# Patient Record
Sex: FEMALE | Race: WHITE | Employment: FULL TIME | URBAN - METROPOLITAN AREA
[De-identification: names, ages, dates, MRNs, and addresses within clinical notes are randomized per-mention and may not be internally consistent; named-entity substitution may affect disease eponyms.]

---

## 2017-06-15 ENCOUNTER — OP HISTORICAL/CONVERTED ENCOUNTER (OUTPATIENT)
Dept: OTHER | Age: 27
End: 2017-06-15

## 2017-08-24 ENCOUNTER — OP HISTORICAL/CONVERTED ENCOUNTER (OUTPATIENT)
Dept: OTHER | Age: 27
End: 2017-08-24

## 2017-08-27 ENCOUNTER — IP HISTORICAL/CONVERTED ENCOUNTER (OUTPATIENT)
Dept: OTHER | Age: 27
End: 2017-08-27

## 2018-03-05 ENCOUNTER — ED HISTORICAL/CONVERTED ENCOUNTER (OUTPATIENT)
Dept: OTHER | Age: 28
End: 2018-03-05

## 2018-09-21 ENCOUNTER — OP HISTORICAL/CONVERTED ENCOUNTER (OUTPATIENT)
Dept: OTHER | Age: 28
End: 2018-09-21

## 2020-10-20 DIAGNOSIS — N39.0 URINARY TRACT INFECTION WITHOUT HEMATURIA, SITE UNSPECIFIED: Primary | ICD-10-CM

## 2020-10-20 RX ORDER — CIPROFLOXACIN 500 MG/1
500 TABLET ORAL 2 TIMES DAILY
Qty: 14 TAB | Refills: 0 | Status: SHIPPED | OUTPATIENT
Start: 2020-10-20 | End: 2020-10-27

## 2020-12-04 DIAGNOSIS — G47.429 NARCOLEPSY DUE TO UNDERLYING CONDITION WITHOUT CATAPLEXY: Primary | ICD-10-CM

## 2020-12-04 RX ORDER — ARMODAFINIL 200 MG/1
1 TABLET ORAL DAILY
Qty: 60 TAB | Refills: 2 | Status: SHIPPED | OUTPATIENT
Start: 2020-12-04

## 2020-12-17 ENCOUNTER — OFFICE VISIT (OUTPATIENT)
Dept: PRIMARY CARE CLINIC | Age: 30
End: 2020-12-17
Payer: COMMERCIAL

## 2020-12-17 VITALS — TEMPERATURE: 98.2 F | HEART RATE: 94 BPM | OXYGEN SATURATION: 98 %

## 2020-12-17 DIAGNOSIS — Z13.83 SCREENING FOR CARDIOVASCULAR, RESPIRATORY, AND GENITOURINARY DISEASES: ICD-10-CM

## 2020-12-17 DIAGNOSIS — Z13.6 SCREENING FOR CARDIOVASCULAR, RESPIRATORY, AND GENITOURINARY DISEASES: ICD-10-CM

## 2020-12-17 DIAGNOSIS — R11.0 NAUSEA: ICD-10-CM

## 2020-12-17 DIAGNOSIS — Z13.89 SCREENING FOR CARDIOVASCULAR, RESPIRATORY, AND GENITOURINARY DISEASES: ICD-10-CM

## 2020-12-17 DIAGNOSIS — R68.89 FLU-LIKE SYMPTOMS: Primary | ICD-10-CM

## 2020-12-17 LAB
FLUAV+FLUBV AG NOSE QL IA.RAPID: NEGATIVE
FLUAV+FLUBV AG NOSE QL IA.RAPID: NEGATIVE
VALID INTERNAL CONTROL?: YES

## 2020-12-17 PROCEDURE — 99202 OFFICE O/P NEW SF 15 MIN: CPT | Performed by: NURSE PRACTITIONER

## 2020-12-17 PROCEDURE — 87804 INFLUENZA ASSAY W/OPTIC: CPT | Performed by: NURSE PRACTITIONER

## 2020-12-17 RX ORDER — ONDANSETRON 8 MG/1
8 TABLET, ORALLY DISINTEGRATING ORAL
Qty: 21 TAB | Refills: 0 | Status: SHIPPED | OUTPATIENT
Start: 2020-12-17

## 2020-12-19 LAB — SARS-COV-2, NAA: NOT DETECTED

## 2020-12-29 ENCOUNTER — OFFICE VISIT (OUTPATIENT)
Dept: PRIMARY CARE CLINIC | Age: 30
End: 2020-12-29
Payer: COMMERCIAL

## 2020-12-29 VITALS — OXYGEN SATURATION: 98 % | TEMPERATURE: 98.1 F | HEART RATE: 80 BPM

## 2020-12-29 DIAGNOSIS — R53.83 FATIGUE, UNSPECIFIED TYPE: ICD-10-CM

## 2020-12-29 DIAGNOSIS — Z13.89 SCREENING FOR CARDIOVASCULAR, RESPIRATORY, AND GENITOURINARY DISEASES: ICD-10-CM

## 2020-12-29 DIAGNOSIS — Z13.83 SCREENING FOR CARDIOVASCULAR, RESPIRATORY, AND GENITOURINARY DISEASES: ICD-10-CM

## 2020-12-29 DIAGNOSIS — R05.9 COUGH: ICD-10-CM

## 2020-12-29 DIAGNOSIS — Z20.822 SUSPECTED SEVERE ACUTE RESPIRATORY SYNDROME CORONAVIRUS 2 (SARS-COV-2) INFECTION: ICD-10-CM

## 2020-12-29 DIAGNOSIS — Z20.822 EXPOSURE TO COVID-19 VIRUS: Primary | ICD-10-CM

## 2020-12-29 DIAGNOSIS — Z13.6 SCREENING FOR CARDIOVASCULAR, RESPIRATORY, AND GENITOURINARY DISEASES: ICD-10-CM

## 2020-12-29 PROCEDURE — 99213 OFFICE O/P EST LOW 20 MIN: CPT | Performed by: NURSE PRACTITIONER

## 2021-01-01 ENCOUNTER — TELEPHONE (OUTPATIENT)
Dept: PRIMARY CARE CLINIC | Age: 31
End: 2021-01-01

## 2021-01-01 LAB — SARS-COV-2, NAA: DETECTED

## 2021-01-01 NOTE — PROGRESS NOTES
Berenice Monteiro is a 31 y.o. female who was seen in clinic today (1/1/2021) for an acute visit. Assessment/Plan:          * Covid 19 sample submitted to [facility]       * Patient given detailed CDC instructions contained within After Visit Summary    Diagnoses and all orders for this visit:    1. Exposure to COVID-19 virus  -     NOVEL CORONAVIRUS (COVID-19)    2. Screening for cardiovascular, respiratory, and genitourinary diseases  -     NOVEL CORONAVIRUS (COVID-19)    3. Cough    4. Fatigue, unspecified type    5. Suspected severe acute respiratory syndrome coronavirus 2 (SARS-CoV-2) infection    Other orders  -     DROPLET PLUS; Standing    (Addendum on 1/1/2020) Pt has tested pos for covid 19. She is aware of test results and is continuing her quarantine. Likely initial false negative covid-19. Although has been 10 days since onset; she has not met symptom based criteria of symptom resolution. She will RTW or see me in clinic as directed by employee health, but until she is % back to baseline with symptoms, I will continue to recommend additional time off and quarantine based on CDC guidance. Subjective:   Rina Elizabeth was seen today for return to work with lower resp tract infection due to covid-19. [employer] employee, she is an RN. Was initially screened negative for 12/17/2020 covid 19 despite having symptoms and exposure (see historical note). Symptoms have been off and in, with some improvement and then worsening again. She is still experiencing cough, sob, severe fatigue, alteration smell/taste. She has been taking OTC cold preparations prn with some relief. She denies a recent history/current: fever, wheezing. She still feels unable to return to work. Travel Screening     Question   Response    In the last month, have you been in contact with someone who was confirmed or suspected to have Coronavirus / COVID-19?   Yes    Have you had a COVID-19 viral test in the last 14 days?  Yes - Negative result    Do you have any of the following new or worsening symptoms? Cough; Shortness of breath; Fatigue; Loss of smell; Loss of taste    Have you traveled internationally in the last month? No      Travel History   Travel since 12/01/20     No documented travel since 12/01/20          ROS - Pertinent items are noted in HPI    Patient Active Problem List   Diagnosis Code    Morbid obesity ([facility] [address] E66.01    Reflux [id number]     Home Medications    Medication Sig Start Date End Date Taking? Authorizing Provider   ondansetron (ZOFRAN ODT) 8 mg disintegrating tablet Take 1 Tab by mouth every eight (8) hours as needed for Nausea or Vomiting. 12/17/20   Aiden Santana NP   armodafiniL (NuvigiL) 200 mg tab Take 1 Tab by mouth daily. Max Daily Amount: 1 Tab. 12/4/20   Jesse Patterson MD   ethinyl estradiol-etonogestrel (NUVARING) 0.12-0.015 mg/24 hr vaginal ring by Intravaginally route. Provider, Historical   multivitamin (ONE A DAY) tablet Take 1 Tab by mouth daily. Provider, Historical   acetaminophen (TYLENOL) 325 mg tablet Take  by mouth every four (4) hours as needed. Provider, Historical      Allergies   Allergen Reactions    Pcn [Penicillins] Hives          Objective:   Physical Exam  General:   alert, cooperative, acutely ill   Ears: Normal external ear canals AU   Neck: supple, symmetrical, trachea midline. Heart: normal rate, regular rhythm   Lungs: clear to auscultation bilaterally       Visit Vitals  Pulse 80   Temp 98.1 °F (36.7 °C)   SpO2 98%         Disclaimer:        Medication risks/benefits/costs/interactions/alternatives discussed with patient. She was given an after visit summary which includes diagnoses, current medications, & vitals. She expressed understanding with the diagnosis and plan. Aspects of this note may have been generated using voice recognition software. Despite editing, there may be some syntax errors.        Rodolfo Scott NP

## 2021-01-07 ENCOUNTER — OFFICE VISIT (OUTPATIENT)
Dept: PRIMARY CARE CLINIC | Age: 31
End: 2021-01-07

## 2021-01-07 VITALS — TEMPERATURE: 98.1 F | HEART RATE: 70 BPM | OXYGEN SATURATION: 99 %

## 2021-01-07 DIAGNOSIS — Z86.16 HISTORY OF 2019 NOVEL CORONAVIRUS DISEASE (COVID-19): Primary | ICD-10-CM

## 2021-01-07 DIAGNOSIS — Z76.89 RETURN TO WORK EVALUATION: ICD-10-CM

## 2021-01-07 PROCEDURE — 99212 OFFICE O/P EST SF 10 MIN: CPT | Performed by: NURSE PRACTITIONER

## 2021-01-07 NOTE — PROGRESS NOTES
Mehreen Deal is a 31 y.o. female who was seen in clinic today (1/7/2021) for an acute visit. Assessment/Plan:            * Patient given detailed CDC instructions contained within After Visit Summary    Diagnoses and all orders for this visit:    1. History of 2019 novel coronavirus disease (COVID-19)    2. Return to work evaluation       known covid-19. Patient has met Centers for Disease Control symptom based criteria for no longer being contagious and ending quarantine. Additionally, she feels able to return to work. Based on my exam, I believe patient may return to work safely. Reviewed with her that COVID-19 pandemic is an evolving situation with rapidly changing recommendations & guidelines, continue to practice hand hygiene, social distancing, wearing of facial coverings; re-infections with covid-19 have been reported although risk remains low. Medical decisions are made based on the the best information available at the time. Recommended she stay tuned for updates published by trusted sources such as the CDC/[facility] websites and to advise your PCP of any unexpected changes in clinical condition     Subjective:   Lexis Diallo was seen today for return to work following lower resp tract infection due to covid-19. Was initially diagnosed with covid 19 on 12/17/2020. She denies a recent history/current: cough, fever, wheezing, SOB, and PEDRAZA. Feeling 100% of baseline normal.      Travel Screening     Question   Response    In the last month, have you been in contact with someone who was confirmed or suspected to have Coronavirus / COVID-19? Yes    Have you had a COVID-19 viral test in the last 14 days? Yes - Positive result    Do you have any of the following new or worsening symptoms? None of these    Have you traveled internationally in the last month?   No      Travel History   Travel since 12/07/20     No documented travel since 12/07/20          ROS - Pertinent items are noted in HPI    Patient Active Problem List   Diagnosis Code    Morbid obesity ([facility] E66.01    Reflux [id number]     Home Medications    Medication Sig Start Date End Date Taking? Authorizing Provider   ondansetron (ZOFRAN ODT) 8 mg disintegrating tablet Take 1 Tab by mouth every eight (8) hours as needed for Nausea or Vomiting. 12/17/20   Gabriel Fernández NP   armodafiniL (NuvigiL) 200 mg tab Take 1 Tab by mouth daily. Max Daily Amount: 1 Tab. 12/4/20   Lucy Greene MD   ethinyl estradiol-etonogestrel (NUVARING) 0.12-0.015 mg/24 hr vaginal ring by Intravaginally route. Provider, Historical   multivitamin (ONE A DAY) tablet Take 1 Tab by mouth daily. Provider, Historical   acetaminophen (TYLENOL) 325 mg tablet Take  by mouth every four (4) hours as needed. Provider, Historical      Allergies   Allergen Reactions    Pcn [Penicillins] Hives          Objective:   Physical Exam  General:   alert, cooperative, no distress   Ears: Normal external ear canals AU   Neck: supple, symmetrical, trachea midline. Heart: normal rate, regular rhythm   Lungs: clear to auscultation bilaterally       Visit Vitals  Pulse 70   Temp 98.1 °F (36.7 °C)   SpO2 99%         Disclaimer:        Medication risks/benefits/costs/interactions/alternatives discussed with patient. She was given an after visit summary which includes diagnoses, current medications, & vitals. She expressed understanding with the diagnosis and plan. Aspects of this note may have been generated using voice recognition software. Despite editing, there may be some syntax errors.        Nathen Helms NP

## 2022-11-29 ENCOUNTER — HOSPITAL ENCOUNTER (OUTPATIENT)
Dept: PREADMISSION TESTING | Age: 32
Discharge: HOME | End: 2022-11-29
Payer: COMMERCIAL

## 2022-11-29 VITALS
SYSTOLIC BLOOD PRESSURE: 130 MMHG | HEIGHT: 65 IN | DIASTOLIC BLOOD PRESSURE: 75 MMHG | HEART RATE: 75 BPM | OXYGEN SATURATION: 99 % | RESPIRATION RATE: 18 BRPM | WEIGHT: 206 LBS | TEMPERATURE: 98 F | BODY MASS INDEX: 34.32 KG/M2

## 2022-11-29 LAB
ANION GAP SERPL CALC-SCNC: 8 MMOL/L (ref 5–15)
ATRIAL RATE: 67 BPM
BASOPHILS # BLD: 0.1 K/UL (ref 0–0.1)
BASOPHILS NFR BLD: 1 % (ref 0–1)
BUN SERPL-MCNC: 13 MG/DL (ref 6–20)
BUN/CREAT SERPL: 13 (ref 12–20)
CALCIUM SERPL-MCNC: 9.3 MG/DL (ref 8.5–10.1)
CALCULATED P AXIS, ECG09: 66 DEGREES
CALCULATED R AXIS, ECG10: 21 DEGREES
CALCULATED T AXIS, ECG11: 18 DEGREES
CHLORIDE SERPL-SCNC: 103 MMOL/L (ref 97–108)
CO2 SERPL-SCNC: 29 MMOL/L (ref 21–32)
CREAT SERPL-MCNC: 0.99 MG/DL (ref 0.55–1.02)
DIAGNOSIS, 93000: NORMAL
DIFFERENTIAL METHOD BLD: NORMAL
EOSINOPHIL # BLD: 0.1 K/UL (ref 0–0.4)
EOSINOPHIL NFR BLD: 2 % (ref 0–7)
ERYTHROCYTE [DISTWIDTH] IN BLOOD BY AUTOMATED COUNT: 12.7 % (ref 11.5–14.5)
GLUCOSE SERPL-MCNC: 74 MG/DL (ref 65–100)
HCT VFR BLD AUTO: 42.7 % (ref 35–47)
HGB BLD-MCNC: 14 G/DL (ref 11.5–16)
IMM GRANULOCYTES # BLD AUTO: 0 K/UL (ref 0–0.04)
IMM GRANULOCYTES NFR BLD AUTO: 0 % (ref 0–0.5)
LYMPHOCYTES # BLD: 1.7 K/UL (ref 0.8–3.5)
LYMPHOCYTES NFR BLD: 18 % (ref 12–49)
MCH RBC QN AUTO: 29.6 PG (ref 26–34)
MCHC RBC AUTO-ENTMCNC: 32.8 G/DL (ref 30–36.5)
MCV RBC AUTO: 90.3 FL (ref 80–99)
MONOCYTES # BLD: 0.6 K/UL (ref 0–1)
MONOCYTES NFR BLD: 7 % (ref 5–13)
NEUTS SEG # BLD: 6.9 K/UL (ref 1.8–8)
NEUTS SEG NFR BLD: 72 % (ref 32–75)
NRBC # BLD: 0 K/UL (ref 0–0.01)
NRBC BLD-RTO: 0 PER 100 WBC
P-R INTERVAL, ECG05: 126 MS
PLATELET # BLD AUTO: 192 K/UL (ref 150–400)
PMV BLD AUTO: 10.9 FL (ref 8.9–12.9)
POTASSIUM SERPL-SCNC: 4.2 MMOL/L (ref 3.5–5.1)
Q-T INTERVAL, ECG07: 416 MS
QRS DURATION, ECG06: 80 MS
QTC CALCULATION (BEZET), ECG08: 439 MS
RBC # BLD AUTO: 4.73 M/UL (ref 3.8–5.2)
SODIUM SERPL-SCNC: 140 MMOL/L (ref 136–145)
VENTRICULAR RATE, ECG03: 67 BPM
WBC # BLD AUTO: 9.5 K/UL (ref 3.6–11)

## 2022-11-29 PROCEDURE — 85025 COMPLETE CBC W/AUTO DIFF WBC: CPT

## 2022-11-29 PROCEDURE — 93005 ELECTROCARDIOGRAM TRACING: CPT

## 2022-11-29 PROCEDURE — 80048 BASIC METABOLIC PNL TOTAL CA: CPT

## 2022-11-29 PROCEDURE — 36415 COLL VENOUS BLD VENIPUNCTURE: CPT

## 2022-11-29 PROCEDURE — 86900 BLOOD TYPING SEROLOGIC ABO: CPT

## 2022-11-29 NOTE — PERIOP NOTES
[address] [address] [facility]   MAIN OR                                  [phone]   MAIN PRE OP                          [phone]                                                                                AMBULATORY PRE OP          [phone]  PRE-ADMISSION TESTING    [phone]   Surgery Date:  12/15/2022       Is surgery arrival time given by surgeon? NO  If NO, [address] staff will call you between 3 and 7pm the day before your surgery with your arrival time. (If your surgery is on a Monday, we will call you the Friday before.)    Call [phone] after 7pm Monday-Friday if you did not receive this call. INSTRUCTIONS BEFORE YOUR SURGERY   When You  Arrive Arrive at the 2nd [address] [facility] on the day of your surgery  Have your insurance card, photo ID, and any copayment (if needed)   Food   and   Drink NO food or drink after midnight the night before surgery    This means NO water, gum, mints, coffee, juice, etc.  No alcohol (beer, wine, liquor) or marijuana 24 hours before and after surgery   Medications to   TAKE   Morning of Surgery MEDICATIONS TO TAKE THE MORNING OF SURGERY WITH A SIP OF WATER:   None     Medications  To  STOP      7 days before surgery Non-Steroidal anti-inflammatory Drugs (NSAID's): for example, Ibuprofen (Advil, Motrin), Naproxen (Aleve)  Aspirin, if taking for pain   Herbal supplements, vitamins, and fish oil  (Pain medications not listed above, including Tylenol may be taken)   Blood  Thinners If you take  Aspirin, Plavix, Coumadin, or any blood-thinning or anti-blood clot medicine, talk to the doctor who prescribed the medications for pre-operative instructions.    Bathing Clothing  Jewelry  Valuables     If you shower the morning of surgery, please do not apply anything to your skin (lotions, powders, deodorant, or makeup, especially mascara)  Follow Chlorhexidine [employer] body wash instructions provided to you during PAT appointment. Begin 3 days prior to surgery. Do not shave or trim anywhere 24 hours before surgery  Wear your hair loose or down; no pony-tails, buns, or metal hair clips  Wear loose, comfortable, clean clothes  Wear glasses instead of contacts. Bring a case to keep your glasses safe. Leave money, valuables, and jewelry, including body piercings, at home     Going Home - or Spending the Night SAME-DAY SURGERY: You must have a responsible adult drive you home and stay with you 24 hours after surgery. You may not drive for 24 hours after surgery. ADMITS: If your doctor is keeping you in the hospital after surgery, leave personal belongings/luggage in your car until you have a hospital room number. Hospital discharge time is 12 noon  Drivers must be here before 12 noon unless you are told differently   Special Instructions   Free  parking available from 7am until 5pm.     Follow all instructions so your surgery wont be cancelled. Please, be on time. If a situation occurs and you are delayed the day of surgery, call [phone]. If your physical condition changes (like a fever, cold, flu, etc.) call your surgeon. Home medication(s) reviewed and verified via LIST and VERBALLY  during PAT appointment. The patient was contacted IN-PERSON  The patient verbalizes understanding of all instructions and DOES NOT   need reinforcement.

## 2022-11-29 NOTE — PERIOP NOTES
I spoke to Nani Baker at [place] [address] office to verify if the patient needed preoperative labs and EKG and she stated no labs needed for patients under the age of 28 per anesthesia. I discussed with her that our anesthesia protocol is not age based but she stated the patient needed no preop labs or EKG.

## 2022-11-30 LAB
ABO + RH BLD: NORMAL
BLOOD GROUP ANTIBODIES SERPL: NORMAL
SPECIMEN EXP DATE BLD: NORMAL

## 2022-12-14 ENCOUNTER — ANESTHESIA EVENT (OUTPATIENT)
Dept: SURGERY | Age: 32
End: 2022-12-14
Payer: SELF-PAY

## 2022-12-15 ENCOUNTER — ANESTHESIA (OUTPATIENT)
Dept: SURGERY | Age: 32
End: 2022-12-15
Payer: SELF-PAY

## 2022-12-15 ENCOUNTER — HOSPITAL ENCOUNTER (OUTPATIENT)
Age: 32
Setting detail: OBSERVATION
Discharge: HOME | End: 2022-12-16
Attending: SURGERY | Admitting: SURGERY
Payer: SELF-PAY

## 2022-12-15 PROBLEM — Z98.890 S/P ABDOMINOPLASTY: Status: ACTIVE | Noted: 2022-12-15

## 2022-12-15 LAB — HCG UR QL: NEGATIVE

## 2022-12-15 PROCEDURE — 77030005513 HC CATH URETH FOL11 MDII -B: Performed by: SURGERY

## 2022-12-15 PROCEDURE — 77030019908 HC STETH ESOPH SIMS -A: Performed by: NURSE ANESTHETIST, CERTIFIED REGISTERED

## 2022-12-15 PROCEDURE — 74011000258 HC RX REV CODE- 258: Performed by: SURGERY

## 2022-12-15 PROCEDURE — 77030040361 HC SLV COMPR DVT MDII -B

## 2022-12-15 PROCEDURE — 77030002986 HC SUT PROL J&J -A: Performed by: SURGERY

## 2022-12-15 PROCEDURE — 77030002933 HC SUT MCRYL J&J -A: Performed by: SURGERY

## 2022-12-15 PROCEDURE — 74011250637 HC RX REV CODE- 250/637: Performed by: NURSE PRACTITIONER

## 2022-12-15 PROCEDURE — 76060000043 HC ANESTHESIA 6 TO 6.5 HR: Performed by: SURGERY

## 2022-12-15 PROCEDURE — 74011250636 HC RX REV CODE- 250/636: Performed by: SURGERY

## 2022-12-15 PROCEDURE — 77030002996 HC SUT SLK J&J -A: Performed by: SURGERY

## 2022-12-15 PROCEDURE — 74011000250 HC RX REV CODE- 250: Performed by: NURSE ANESTHETIST, CERTIFIED REGISTERED

## 2022-12-15 PROCEDURE — 74011250636 HC RX REV CODE- 250/636: Performed by: ANESTHESIOLOGY

## 2022-12-15 PROCEDURE — 76010000140 HC OR TIME 6 TO 6.5 HR: Performed by: SURGERY

## 2022-12-15 PROCEDURE — 77030028700 HC BLD TISS PLSM MEDT -E: Performed by: SURGERY

## 2022-12-15 PROCEDURE — 76210000017 HC OR PH I REC 1.5 TO 2 HR: Performed by: SURGERY

## 2022-12-15 PROCEDURE — G0378 HOSPITAL OBSERVATION PER HR: HCPCS

## 2022-12-15 PROCEDURE — 77030002916 HC SUT ETHLN J&J -A: Performed by: SURGERY

## 2022-12-15 PROCEDURE — 77030013629 HC ELECTRD NDL STRY -B: Performed by: SURGERY

## 2022-12-15 PROCEDURE — C9290 INJ, BUPIVACAINE LIPOSOME: HCPCS | Performed by: SURGERY

## 2022-12-15 PROCEDURE — 74011000250 HC RX REV CODE- 250: Performed by: SURGERY

## 2022-12-15 PROCEDURE — 77030011264 HC ELECTRD BLD EXT COVD -A: Performed by: SURGERY

## 2022-12-15 PROCEDURE — 77030019940 HC BLNKT HYPOTHRM STRY -B: Performed by: SURGERY

## 2022-12-15 PROCEDURE — 88305 TISSUE EXAM BY PATHOLOGIST: CPT

## 2022-12-15 PROCEDURE — 81025 URINE PREGNANCY TEST: CPT

## 2022-12-15 PROCEDURE — 77030026438 HC STYL ET INTUB CARD -A: Performed by: NURSE ANESTHETIST, CERTIFIED REGISTERED

## 2022-12-15 PROCEDURE — 77030008463 HC STPLR SKN PROX J&J -B: Performed by: SURGERY

## 2022-12-15 PROCEDURE — 74011000250 HC RX REV CODE- 250: Performed by: NURSE PRACTITIONER

## 2022-12-15 PROCEDURE — 74011250636 HC RX REV CODE- 250/636: Performed by: NURSE ANESTHETIST, CERTIFIED REGISTERED

## 2022-12-15 PROCEDURE — 77030040922 HC BLNKT HYPOTHRM STRY -A

## 2022-12-15 PROCEDURE — 88300 SURGICAL PATH GROSS: CPT

## 2022-12-15 PROCEDURE — 77030020061 HC IV BLD WRMR ADMIN SET 3M -B: Performed by: ANESTHESIOLOGY

## 2022-12-15 PROCEDURE — 74011250636 HC RX REV CODE- 250/636: Performed by: NURSE PRACTITIONER

## 2022-12-15 PROCEDURE — 77030008684 HC TU ET CUF COVD -B: Performed by: NURSE ANESTHETIST, CERTIFIED REGISTERED

## 2022-12-15 PROCEDURE — 77030031139 HC SUT VCRL2 J&J -A: Performed by: SURGERY

## 2022-12-15 PROCEDURE — 77030040506 HC DRN WND MDII -A: Performed by: SURGERY

## 2022-12-15 PROCEDURE — 77030002966 HC SUT PDS J&J -A: Performed by: SURGERY

## 2022-12-15 PROCEDURE — 77030013079 HC BLNKT BAIR HGGR 3M -A: Performed by: ANESTHESIOLOGY

## 2022-12-15 PROCEDURE — 77030036554: Performed by: SURGERY

## 2022-12-15 PROCEDURE — 2709999900 HC NON-CHARGEABLE SUPPLY: Performed by: SURGERY

## 2022-12-15 DEVICE — SILICONE GEL BREAST IMPLANT, SMOOTH ROUND, MODERATE PLUS PROFILE, 435 CC
Type: IMPLANTABLE DEVICE | Site: BREAST | Status: FUNCTIONAL
Brand: SIENTRA SILICONE GEL BREAST IMPLANT

## 2022-12-15 RX ORDER — ONDANSETRON 2 MG/ML
4 INJECTION INTRAMUSCULAR; INTRAVENOUS AS NEEDED
Status: DISCONTINUED | OUTPATIENT
Start: 2022-12-15 | End: 2022-12-15 | Stop reason: HOSPADM

## 2022-12-15 RX ORDER — BUPIVACAINE HYDROCHLORIDE AND EPINEPHRINE 5; 5 MG/ML; UG/ML
INJECTION, SOLUTION EPIDURAL; INTRACAUDAL; PERINEURAL AS NEEDED
Status: DISCONTINUED | OUTPATIENT
Start: 2022-12-15 | End: 2022-12-15 | Stop reason: HOSPADM

## 2022-12-15 RX ORDER — ADHESIVE BANDAGE
30 BANDAGE TOPICAL DAILY PRN
Status: DISCONTINUED | OUTPATIENT
Start: 2022-12-15 | End: 2022-12-16 | Stop reason: HOSPADM

## 2022-12-15 RX ORDER — ONDANSETRON 2 MG/ML
4 INJECTION INTRAMUSCULAR; INTRAVENOUS
Status: DISCONTINUED | OUTPATIENT
Start: 2022-12-15 | End: 2022-12-16 | Stop reason: HOSPADM

## 2022-12-15 RX ORDER — DEXAMETHASONE SODIUM PHOSPHATE 4 MG/ML
INJECTION, SOLUTION INTRA-ARTICULAR; INTRALESIONAL; INTRAMUSCULAR; INTRAVENOUS; SOFT TISSUE AS NEEDED
Status: DISCONTINUED | OUTPATIENT
Start: 2022-12-15 | End: 2022-12-15 | Stop reason: HOSPADM

## 2022-12-15 RX ORDER — DOCUSATE SODIUM 100 MG/1
100 CAPSULE, LIQUID FILLED ORAL 2 TIMES DAILY
Status: DISCONTINUED | OUTPATIENT
Start: 2022-12-15 | End: 2022-12-16 | Stop reason: HOSPADM

## 2022-12-15 RX ORDER — SODIUM CHLORIDE 0.9 % (FLUSH) 0.9 %
5-40 SYRINGE (ML) INJECTION AS NEEDED
Status: DISCONTINUED | OUTPATIENT
Start: 2022-12-15 | End: 2022-12-16 | Stop reason: HOSPADM

## 2022-12-15 RX ORDER — DIPHENHYDRAMINE HYDROCHLORIDE 50 MG/ML
12.5 INJECTION, SOLUTION INTRAMUSCULAR; INTRAVENOUS AS NEEDED
Status: DISCONTINUED | OUTPATIENT
Start: 2022-12-15 | End: 2022-12-15 | Stop reason: HOSPADM

## 2022-12-15 RX ORDER — KETAMINE HYDROCHLORIDE 50 MG/ML
INJECTION, SOLUTION INTRAMUSCULAR; INTRAVENOUS AS NEEDED
Status: DISCONTINUED | OUTPATIENT
Start: 2022-12-15 | End: 2022-12-15 | Stop reason: HOSPADM

## 2022-12-15 RX ORDER — SODIUM CHLORIDE, SODIUM LACTATE, POTASSIUM CHLORIDE, CALCIUM CHLORIDE 600; 310; 30; 20 MG/100ML; MG/100ML; MG/100ML; MG/100ML
125 INJECTION, SOLUTION INTRAVENOUS CONTINUOUS
Status: DISCONTINUED | OUTPATIENT
Start: 2022-12-15 | End: 2022-12-15 | Stop reason: HOSPADM

## 2022-12-15 RX ORDER — HYDROMORPHONE HYDROCHLORIDE 2 MG/ML
INJECTION, SOLUTION INTRAMUSCULAR; INTRAVENOUS; SUBCUTANEOUS AS NEEDED
Status: DISCONTINUED | OUTPATIENT
Start: 2022-12-15 | End: 2022-12-15 | Stop reason: HOSPADM

## 2022-12-15 RX ORDER — SODIUM CHLORIDE 0.9 % (FLUSH) 0.9 %
5-40 SYRINGE (ML) INJECTION EVERY 8 HOURS
Status: DISCONTINUED | OUTPATIENT
Start: 2022-12-15 | End: 2022-12-16 | Stop reason: HOSPADM

## 2022-12-15 RX ORDER — ONDANSETRON 2 MG/ML
INJECTION INTRAMUSCULAR; INTRAVENOUS AS NEEDED
Status: DISCONTINUED | OUTPATIENT
Start: 2022-12-15 | End: 2022-12-15 | Stop reason: HOSPADM

## 2022-12-15 RX ORDER — FENTANYL CITRATE 50 UG/ML
INJECTION, SOLUTION INTRAMUSCULAR; INTRAVENOUS AS NEEDED
Status: DISCONTINUED | OUTPATIENT
Start: 2022-12-15 | End: 2022-12-15 | Stop reason: HOSPADM

## 2022-12-15 RX ORDER — DIAZEPAM 5 MG/1
5 TABLET ORAL
Status: DISCONTINUED | OUTPATIENT
Start: 2022-12-15 | End: 2022-12-16 | Stop reason: HOSPADM

## 2022-12-15 RX ORDER — LIDOCAINE HYDROCHLORIDE AND EPINEPHRINE 20; 5 MG/ML; UG/ML
INJECTION, SOLUTION EPIDURAL; INFILTRATION; INTRACAUDAL; PERINEURAL AS NEEDED
Status: DISCONTINUED | OUTPATIENT
Start: 2022-12-15 | End: 2022-12-15 | Stop reason: HOSPADM

## 2022-12-15 RX ORDER — NALOXONE HYDROCHLORIDE 0.4 MG/ML
0.4 INJECTION, SOLUTION INTRAMUSCULAR; INTRAVENOUS; SUBCUTANEOUS AS NEEDED
Status: DISCONTINUED | OUTPATIENT
Start: 2022-12-15 | End: 2022-12-16 | Stop reason: HOSPADM

## 2022-12-15 RX ORDER — SODIUM CHLORIDE 9 MG/ML
125 INJECTION, SOLUTION INTRAVENOUS CONTINUOUS
Status: DISCONTINUED | OUTPATIENT
Start: 2022-12-15 | End: 2022-12-16

## 2022-12-15 RX ORDER — LIDOCAINE HYDROCHLORIDE 20 MG/ML
INJECTION, SOLUTION EPIDURAL; INFILTRATION; INTRACAUDAL; PERINEURAL AS NEEDED
Status: DISCONTINUED | OUTPATIENT
Start: 2022-12-15 | End: 2022-12-15 | Stop reason: HOSPADM

## 2022-12-15 RX ORDER — ROCURONIUM BROMIDE 10 MG/ML
INJECTION, SOLUTION INTRAVENOUS AS NEEDED
Status: DISCONTINUED | OUTPATIENT
Start: 2022-12-15 | End: 2022-12-15 | Stop reason: HOSPADM

## 2022-12-15 RX ORDER — OXYCODONE HYDROCHLORIDE 5 MG/1
5 TABLET ORAL
Status: DISCONTINUED | OUTPATIENT
Start: 2022-12-15 | End: 2022-12-16 | Stop reason: HOSPADM

## 2022-12-15 RX ORDER — HYDROMORPHONE HYDROCHLORIDE 1 MG/ML
0.5 INJECTION, SOLUTION INTRAMUSCULAR; INTRAVENOUS; SUBCUTANEOUS
Status: DISCONTINUED | OUTPATIENT
Start: 2022-12-15 | End: 2022-12-15 | Stop reason: HOSPADM

## 2022-12-15 RX ORDER — SODIUM CHLORIDE, SODIUM LACTATE, POTASSIUM CHLORIDE, CALCIUM CHLORIDE 600; 310; 30; 20 MG/100ML; MG/100ML; MG/100ML; MG/100ML
INJECTION, SOLUTION INTRAVENOUS
Status: DISCONTINUED | OUTPATIENT
Start: 2022-12-15 | End: 2022-12-15 | Stop reason: HOSPADM

## 2022-12-15 RX ORDER — LIDOCAINE HYDROCHLORIDE 10 MG/ML
0.1 INJECTION, SOLUTION EPIDURAL; INFILTRATION; INTRACAUDAL; PERINEURAL AS NEEDED
Status: DISCONTINUED | OUTPATIENT
Start: 2022-12-15 | End: 2022-12-15 | Stop reason: HOSPADM

## 2022-12-15 RX ORDER — ENOXAPARIN SODIUM 100 MG/ML
40 INJECTION SUBCUTANEOUS EVERY 24 HOURS
Status: DISCONTINUED | OUTPATIENT
Start: 2022-12-16 | End: 2022-12-16 | Stop reason: HOSPADM

## 2022-12-15 RX ORDER — PROPOFOL 10 MG/ML
INJECTION, EMULSION INTRAVENOUS
Status: DISCONTINUED | OUTPATIENT
Start: 2022-12-15 | End: 2022-12-15 | Stop reason: HOSPADM

## 2022-12-15 RX ORDER — FAMOTIDINE 10 MG/ML
INJECTION INTRAVENOUS AS NEEDED
Status: DISCONTINUED | OUTPATIENT
Start: 2022-12-15 | End: 2022-12-15 | Stop reason: HOSPADM

## 2022-12-15 RX ORDER — SODIUM CHLORIDE, SODIUM LACTATE, POTASSIUM CHLORIDE, CALCIUM CHLORIDE 600; 310; 30; 20 MG/100ML; MG/100ML; MG/100ML; MG/100ML
150 INJECTION, SOLUTION INTRAVENOUS CONTINUOUS
Status: DISCONTINUED | OUTPATIENT
Start: 2022-12-15 | End: 2022-12-15 | Stop reason: HOSPADM

## 2022-12-15 RX ORDER — ACETAMINOPHEN 325 MG/1
650 TABLET ORAL
Status: DISCONTINUED | OUTPATIENT
Start: 2022-12-15 | End: 2022-12-16 | Stop reason: HOSPADM

## 2022-12-15 RX ORDER — DIPHENHYDRAMINE HYDROCHLORIDE 50 MG/ML
12.5 INJECTION, SOLUTION INTRAMUSCULAR; INTRAVENOUS
Status: DISCONTINUED | OUTPATIENT
Start: 2022-12-15 | End: 2022-12-16 | Stop reason: HOSPADM

## 2022-12-15 RX ORDER — OXYCODONE HYDROCHLORIDE 5 MG/1
10 TABLET ORAL
Status: DISCONTINUED | OUTPATIENT
Start: 2022-12-15 | End: 2022-12-16 | Stop reason: HOSPADM

## 2022-12-15 RX ORDER — MIDAZOLAM HYDROCHLORIDE 1 MG/ML
INJECTION, SOLUTION INTRAMUSCULAR; INTRAVENOUS AS NEEDED
Status: DISCONTINUED | OUTPATIENT
Start: 2022-12-15 | End: 2022-12-15 | Stop reason: HOSPADM

## 2022-12-15 RX ORDER — ALBUTEROL SULFATE 0.83 MG/ML
2.5 SOLUTION RESPIRATORY (INHALATION) AS NEEDED
Status: DISCONTINUED | OUTPATIENT
Start: 2022-12-15 | End: 2022-12-15 | Stop reason: HOSPADM

## 2022-12-15 RX ORDER — DEXMEDETOMIDINE HYDROCHLORIDE 100 UG/ML
INJECTION, SOLUTION INTRAVENOUS AS NEEDED
Status: DISCONTINUED | OUTPATIENT
Start: 2022-12-15 | End: 2022-12-15 | Stop reason: HOSPADM

## 2022-12-15 RX ADMIN — PROPOFOL 200 MCG/KG/MIN: 10 INJECTION, EMULSION INTRAVENOUS at 09:35

## 2022-12-15 RX ADMIN — PROPOFOL 200 MCG/KG/MIN: 10 INJECTION, EMULSION INTRAVENOUS at 11:10

## 2022-12-15 RX ADMIN — DEXMEDETOMIDINE HCL 4 MCG: 100 INJECTION INTRAVENOUS at 09:05

## 2022-12-15 RX ADMIN — PROPOFOL 200 MCG/KG/MIN: 10 INJECTION, EMULSION INTRAVENOUS at 08:50

## 2022-12-15 RX ADMIN — DEXMEDETOMIDINE HCL 4 MCG: 100 INJECTION INTRAVENOUS at 13:50

## 2022-12-15 RX ADMIN — DOCUSATE SODIUM 100 MG: 100 CAPSULE ORAL at 21:30

## 2022-12-15 RX ADMIN — SODIUM CHLORIDE 125 ML/HR: 9 INJECTION, SOLUTION INTRAVENOUS at 15:48

## 2022-12-15 RX ADMIN — PROPOFOL 150 MCG/KG/MIN: 10 INJECTION, EMULSION INTRAVENOUS at 13:02

## 2022-12-15 RX ADMIN — SODIUM CHLORIDE, PRESERVATIVE FREE 10 ML: 5 INJECTION INTRAVENOUS at 21:34

## 2022-12-15 RX ADMIN — CEFAZOLIN SODIUM 2 G: 1 POWDER, FOR SOLUTION INTRAMUSCULAR; INTRAVENOUS at 12:35

## 2022-12-15 RX ADMIN — KETAMINE HYDROCHLORIDE 50 MG: 50 INJECTION INTRAMUSCULAR; INTRAVENOUS at 09:10

## 2022-12-15 RX ADMIN — PROPOFOL 200 MCG/KG/MIN: 10 INJECTION, EMULSION INTRAVENOUS at 11:34

## 2022-12-15 RX ADMIN — MIDAZOLAM HYDROCHLORIDE 2 MG: 1 INJECTION, SOLUTION INTRAMUSCULAR; INTRAVENOUS at 10:37

## 2022-12-15 RX ADMIN — MIDAZOLAM HYDROCHLORIDE 2 MG: 1 INJECTION, SOLUTION INTRAMUSCULAR; INTRAVENOUS at 08:17

## 2022-12-15 RX ADMIN — DEXMEDETOMIDINE HCL 4 MCG: 100 INJECTION INTRAVENOUS at 14:15

## 2022-12-15 RX ADMIN — CEFAZOLIN 2 G: 1 INJECTION, POWDER, FOR SOLUTION INTRAMUSCULAR; INTRAVENOUS at 21:31

## 2022-12-15 RX ADMIN — PROPOFOL 200 MCG/KG/MIN: 10 INJECTION, EMULSION INTRAVENOUS at 12:31

## 2022-12-15 RX ADMIN — MIDAZOLAM HYDROCHLORIDE 1 MG: 1 INJECTION, SOLUTION INTRAMUSCULAR; INTRAVENOUS at 08:19

## 2022-12-15 RX ADMIN — CEFAZOLIN SODIUM 2 G: 1 POWDER, FOR SOLUTION INTRAMUSCULAR; INTRAVENOUS at 08:41

## 2022-12-15 RX ADMIN — DEXAMETHASONE SODIUM PHOSPHATE 4 MG: 4 INJECTION, SOLUTION INTRAMUSCULAR; INTRAVENOUS at 13:34

## 2022-12-15 RX ADMIN — LIDOCAINE HYDROCHLORIDE 60 MG: 20 INJECTION, SOLUTION EPIDURAL; INFILTRATION; INTRACAUDAL; PERINEURAL at 08:25

## 2022-12-15 RX ADMIN — ROCURONIUM BROMIDE 20 MG: 10 INJECTION INTRAVENOUS at 10:37

## 2022-12-15 RX ADMIN — DEXMEDETOMIDINE HCL 4 MCG: 100 INJECTION INTRAVENOUS at 08:55

## 2022-12-15 RX ADMIN — DEXMEDETOMIDINE HCL 4 MCG: 100 INJECTION INTRAVENOUS at 08:57

## 2022-12-15 RX ADMIN — SUGAMMADEX 200 MG: 100 INJECTION, SOLUTION INTRAVENOUS at 14:39

## 2022-12-15 RX ADMIN — ROCURONIUM BROMIDE 30 MG: 10 INJECTION INTRAVENOUS at 09:07

## 2022-12-15 RX ADMIN — FAMOTIDINE 20 MG: 10 INJECTION INTRAVENOUS at 08:17

## 2022-12-15 RX ADMIN — ONDANSETRON HYDROCHLORIDE 4 MG: 2 SOLUTION INTRAMUSCULAR; INTRAVENOUS at 14:17

## 2022-12-15 RX ADMIN — FENTANYL CITRATE 50 MCG: 50 INJECTION, SOLUTION INTRAMUSCULAR; INTRAVENOUS at 11:16

## 2022-12-15 RX ADMIN — HYDROMORPHONE HYDROCHLORIDE 0.4 MG: 2 INJECTION, SOLUTION INTRAMUSCULAR; INTRAVENOUS; SUBCUTANEOUS at 11:51

## 2022-12-15 RX ADMIN — HYDROMORPHONE HYDROCHLORIDE 0.5 MG: 2 INJECTION, SOLUTION INTRAMUSCULAR; INTRAVENOUS; SUBCUTANEOUS at 14:32

## 2022-12-15 RX ADMIN — ROCURONIUM BROMIDE 10 MG: 10 INJECTION INTRAVENOUS at 11:00

## 2022-12-15 RX ADMIN — DEXMEDETOMIDINE HCL 4 MCG: 100 INJECTION INTRAVENOUS at 13:53

## 2022-12-15 RX ADMIN — ROCURONIUM BROMIDE 10 MG: 10 INJECTION INTRAVENOUS at 11:16

## 2022-12-15 RX ADMIN — HYDROMORPHONE HYDROCHLORIDE 0.6 MG: 2 INJECTION, SOLUTION INTRAMUSCULAR; INTRAVENOUS; SUBCUTANEOUS at 12:09

## 2022-12-15 RX ADMIN — ROCURONIUM BROMIDE 10 MG: 10 INJECTION INTRAVENOUS at 11:36

## 2022-12-15 RX ADMIN — DEXMEDETOMIDINE HCL 4 MCG: 100 INJECTION INTRAVENOUS at 08:19

## 2022-12-15 RX ADMIN — SODIUM CHLORIDE, POTASSIUM CHLORIDE, SODIUM LACTATE AND CALCIUM CHLORIDE: 600; 310; 30; 20 INJECTION, SOLUTION INTRAVENOUS at 08:35

## 2022-12-15 RX ADMIN — ROCURONIUM BROMIDE 20 MG: 10 INJECTION INTRAVENOUS at 09:47

## 2022-12-15 RX ADMIN — SODIUM CHLORIDE, POTASSIUM CHLORIDE, SODIUM LACTATE AND CALCIUM CHLORIDE 150 ML/HR: 600; 310; 30; 20 INJECTION, SOLUTION INTRAVENOUS at 07:24

## 2022-12-15 RX ADMIN — ONDANSETRON HYDROCHLORIDE 4 MG: 2 SOLUTION INTRAMUSCULAR; INTRAVENOUS at 08:18

## 2022-12-15 RX ADMIN — DEXAMETHASONE SODIUM PHOSPHATE 4 MG: 4 INJECTION, SOLUTION INTRAMUSCULAR; INTRAVENOUS at 08:35

## 2022-12-15 RX ADMIN — DEXMEDETOMIDINE HCL 4 MCG: 100 INJECTION INTRAVENOUS at 08:48

## 2022-12-15 RX ADMIN — ROCURONIUM BROMIDE 10 MG: 10 INJECTION INTRAVENOUS at 11:34

## 2022-12-15 RX ADMIN — FENTANYL CITRATE 100 MCG: 50 INJECTION, SOLUTION INTRAMUSCULAR; INTRAVENOUS at 08:25

## 2022-12-15 RX ADMIN — PROPOFOL 200 MCG/KG/MIN: 10 INJECTION, EMULSION INTRAVENOUS at 09:15

## 2022-12-15 RX ADMIN — Medication: at 15:48

## 2022-12-15 RX ADMIN — SODIUM CHLORIDE, POTASSIUM CHLORIDE, SODIUM LACTATE AND CALCIUM CHLORIDE: 600; 310; 30; 20 INJECTION, SOLUTION INTRAVENOUS at 13:30

## 2022-12-15 RX ADMIN — DEXMEDETOMIDINE HCL 4 MCG: 100 INJECTION INTRAVENOUS at 13:47

## 2022-12-15 RX ADMIN — ROCURONIUM BROMIDE 20 MG: 10 INJECTION INTRAVENOUS at 12:25

## 2022-12-15 RX ADMIN — PROPOFOL 200 MCG/KG/MIN: 10 INJECTION, EMULSION INTRAVENOUS at 10:15

## 2022-12-15 RX ADMIN — PROPOFOL 200 MCG/KG/MIN: 10 INJECTION, EMULSION INTRAVENOUS at 08:25

## 2022-12-15 RX ADMIN — PROPOFOL 200 MCG/KG/MIN: 10 INJECTION, EMULSION INTRAVENOUS at 10:37

## 2022-12-15 RX ADMIN — PROPOFOL 200 MCG/KG/MIN: 10 INJECTION, EMULSION INTRAVENOUS at 10:00

## 2022-12-15 RX ADMIN — ROCURONIUM BROMIDE 50 MG: 10 INJECTION INTRAVENOUS at 08:26

## 2022-12-15 RX ADMIN — ROCURONIUM BROMIDE 10 MG: 10 INJECTION INTRAVENOUS at 11:40

## 2022-12-15 RX ADMIN — DEXMEDETOMIDINE HCL 4 MCG: 100 INJECTION INTRAVENOUS at 13:45

## 2022-12-15 RX ADMIN — FENTANYL CITRATE 100 MCG: 50 INJECTION, SOLUTION INTRAMUSCULAR; INTRAVENOUS at 09:10

## 2022-12-15 NOTE — PERIOP NOTES
1020 - Patient family updated on surgical progress and patient well being. 1320 - Patient family updated on surgical progress and patient well being.

## 2022-12-15 NOTE — BRIEF OP NOTE
Brief Postoperative Note    Patient: Sana Gomez  YOB: 1990  MRN: [id number]    Date of Procedure: 12/15/2022     Pre-Op Diagnosis: COSMETIC, DYSPHAGIA    Post-Op Diagnosis: Same as preoperative diagnosis. Procedure(s):  FULL ABDOMINOPLASTY, FLANK LIPOSUCTION, BILATERAL BREAST AUGMENTATION WITH FULL MASTOPEXY  ROBOTIC ASSISTED LAP BAND REMOVAL    Surgeon(s):  MD Neo Oneil MD    Surgical Assistant: Surg Asst-1: Bridgette Ventura  Nurse Practitioner: Ayaan Marin NP    Anesthesia: General     Estimated Blood Loss (mL): Minimal    Complications: None    Specimens:   ID Type Source Tests Collected by Time Destination   1 : excised RIGHT breast tissue Preservative Breast  Uzma Mcdonald MD 12/15/2022 1212 Pathology   2 : excised LEFT breast tissue Preservative Breast  Uzma Mcdonald MD 12/15/2022 1213 Pathology   3 : LAP BAND AND TUBING Other Abdomen  Neo Headley MD 12/15/2022 1309 Pathology        Implants:   Implant Name Type Inv. Item Serial No.  Lot No. LRB No. Used Action   IMPLANT BRST GEL 4.6 CM PROJCT 13.2  [facility] [id number]  IMPLANT BRST GEL 4.6 CM PROJCT 13.2  [employer] [facility] [id number] [employer] N/A Left 1 Implanted   IMPLANT BRST GEL 4.6 CM PROJCT 13.2  [employer] [facility] [facility] - [id number]  IMPLANT BRST GEL 4.6 CM PROJCT 13.2  [employer] [facility] [id number] [employer] N/A Right 1 Implanted       Drains:   Salma Meredith #1 12/15/22 Right; Lower Abdomen (Active)       Salma Meredith #2 12/15/22 Left; Lower Abdomen (Active)       Findings: none    Electronically Signed by Mendel Wolfe MD on 12/15/2022 at 2:59 PM

## 2022-12-15 NOTE — H&P
Plastic Surgery PRE-OP Admission History and Physical    Patient: Alberto Necessary MRN: [id number]  SSN: [id number]    YOB: 1990  Age: 34 y.o. Sex: female            Subjective:   Patient is a 34 y.o.  female who presents for bilateral breast augmentation with full mastopexy, abdominoplasty with flank liposuction. .  The patient was evaluated and determined the most appropriate plan of care is to proceed with surgical intervention. Patient denies chest pain, tightness, pain radiating down left arm, palpitations, dizziness, lightheadedness, fevers, chills. Patient denies shortness of breath, wheezing, diarrhea, constipation, abdominal pain. Patient denies urinary problems, dysuria, hesitancy, urgency. Denies skin breakdown, rashes, insect bites or open area. Patient Active Problem List    Diagnosis Date Noted    Morbid obesity ([facility] [address] 07/20/2012    Reflux 07/20/2012     Past Medical History:   Diagnosis Date    Dyspepsia and other specified disorders of function of stomach     Gestational hypertension 2017    Narcolepsy 2010    Nausea & vomiting     PONV (postoperative nausea and vomiting)       Past Surgical History:   Procedure Laterality Date    HX HEENT N/A 1994    tonsillectomyh    HX ORTHOPAEDIC Left 2007    ORIF left arm x3    HX OTHER SURGICAL      lap band 2009      Prior to Admission medications    Medication Sig Start Date End Date Taking? Authorizing Provider   armodafiniL (NuvigiL) 200 mg tab Take 1 Tab by mouth daily. Max Daily Amount: 1 Tab. 12/4/20  Yes Ade Caruso MD   acetaminophen (TYLENOL) 325 mg tablet Take  by mouth every four (4) hours as needed.       Provider, Historical     Current Facility-Administered Medications   Medication Dose Route Frequency    lactated Ringers infusion  150 mL/hr IntraVENous CONTINUOUS    lidocaine (PF) (XYLOCAINE) 10 mg/mL (1 %) injection 0.1 mL  0.1 mL SubCUTAneous PRN    ceFAZolin (ANCEF) 2 g in sterile water (preservative free) 20 mL IV syringe  2 g IntraVENous ONCE      Allergies   Allergen Reactions    Pcn [Penicillins] Hives     States she can take keflex without rxn      Social History     Tobacco Use    Smoking status: Never    Smokeless tobacco: Never   Substance Use Topics    Alcohol use: Not Currently     Comment: 2 drinks per year      Family History   Problem Relation Age of Onset    Stroke Mother     Cancer Maternal Grandmother     Hypertension Maternal Grandmother     Diabetes Paternal Grandmother     Hypertension Paternal Grandfather         Review of Systems  A comprehensive review of systems was negative except for that written in the HPI. Objective:   No data found. No data recorded. Gen: Well-developed,  in no acute distress   HEENT: Pink conjunctivae, PERRL, hearing intact to voice, moist mucous membranes   Neck: Supple, without masses, thyroid non-tender   Resp: No accessory muscle use,  breathing even/ nonlabored. Card: Regular rate and rhythm. Abd: Soft, non-tender, non-distended,   Lymph: No cervical or inguinal adenopathy   Musc: WNL  Skin: No skin breakdown noted. Skin warm, pink, dry. No rashes. Neuro: Cranial nerves are grossly intact, no focal motor weakness, follows commands appropriately   Psych: Good insight, oriented to person, place and time, alert      Labs:   Recent Results (from the past 24 hour(s))   HCG URINE, QL. - POC    Collection Time: 12/15/22  6:57 AM   Result Value Ref Range    Pregnancy test,urine (POC) Negative NEG         Assessment:     Patient Active Problem List    Diagnosis Date Noted    Morbid obesity ([facility] [address] 07/20/2012    Reflux 07/20/2012         Plan:   Proceed with surgical intervention without contraindications. I met with pt. this morning and discussed increased ambulation to improve pulmonary status. We also discussed preop and postop expectations to include pain management. All questions were answered.         Latoya Ortiz NP

## 2022-12-15 NOTE — DISCHARGE INSTRUCTIONS
Discharge abdominoplasty/ breast augmentation  Dr. Yann Babin, NP      Activities  Immediatly after  surgery you will rest quietly for the first 48 hours. You will be able to walk around the house and perform light daily activities; however, during this time, it is not at all unusual for you to feel some pain, soreness and pressure in the chest area. This will gradually subside and Dr. Delonte Lima will give you pain medication to relieve it. Due to your tummy tuck many patients are unable to stand straight up to a few days to a week after surgery. This is due to the abdominal wall being pulled tight. This usually resolves quickly; however, during this time, it is not at all unusual for you to feel some pain and soreness in the treated area. This will gradually subside and     Drains: You may have one to three small incisions in you groin area from which small drain tubes are placed. These drains collect fluid under the skin normally produced after the procedure. The drains will remain for approximately five days and Dr. Delonte Lima will remove them in the office during your follow up visits. You will be given a log to chart the drainage twice per day while the drains are in. Dr. Delonte Lmia and his staff will teach you to do this. You will be required to lay and sleep in a beach chair position with the head elevated, waist bent, legs elevated and knees bent. This will relieve pressure from your abdominal wall and make you feel better. You will sleep in the position for approximately seven days. You will have a dressing on your lower abdominal incision and belly button immediately after surgery. You will remove these bandages 48 hours after surgery and leave the incisions open to air. You may then shower and gently allow the soap and water to run over the incisions and drains.   You may use any normal over-the-counter anti-bacterial soap (Dove, Dial, etc.)    Exercise: No heavy exercise or lifting for a minimum of four weeks following surgery. Dr. Dacosta Parents will then allow you to begin cardio exercises at three weeks and weight lifting at six weeks. This will allow the skin to adhere to it new position. Restricting exercise will allow the implants to remain in the proper position without movement. For the first three days following surgery you should try to restrict your arm movements. Move your arms slowly and avoid sudden jerky movements of the chest and breast area. Keep your arms as close to your body as possible. This allows the implants to remain in place. Dr. Dacosta Parents encourages walking immediately after surgery. This activity will greatly minimize the risk of blood clots in your leg veins. Please no ice or ice packs on your abdomen       Bathing: Do not get dressings wet. You will feel glue on your incisions. With your finger tips, gently wash over incisions. This daily routine will help keep the incisions clean, and will promote wound healing. The glue needs to stay on your incisions for 2 weeks time. After 2 weeks, you can start to pull off the glue if it has not fallen off on its own. Do not submerge yourself in a bath, swimming pool, or whirlpool for two weeks. Under garments: You will also have a abdominal binder that needs to be worn at all times except for showering for 2 weeks. You will get another, more comfortable abdominal binder in the office. The maximum swelling occurs at about three days and then begins to dramatically improve. Mild bruising typically resolves within 14 days. Bathing: You will then be allowed to shower two days after surgery. Wash yourself everywhere, including the incisions gently. You will feel glue on your incisions. With your finger tips, gently wash over incisions. Use mild soap and pat yourself dry and put the bra back on. This daily routine will help keep the incisions clean, and will promote wound healing.   The glue needs to stay on your incisions for 2 weeks time. After 2 weeks, you can start to pull off the glue if it has not fallen off on its own. Medications:      Dilaudid: or Percocet  this is a your pain medication. Take one to two tablets as needed every 3-4 hours. No NSAIDS (advil, ibuprofen 5 days after surgery. This will increase your bleeding risk. Tylenol: (over the counter) 650 mg every 4 hours as needed for mild pain. Be careful because percocet has tylenol in it. You can not exceed 4000 mg a day of tylenol. Zofran: this is a medication for nausea. Take this as needed for nausea every 6-8 hours. Make sure you drink plenty of fluids. Nausea usually resolves after the first 24 hours. Augmentin or Levaquin: this is your antibiotic. Take this medication completley until empty. Valium: this is for muscle relaxation. Your implant was placed beneath the pectoral muscle. This muscle can sometimes feel tight. Valium can help relax this muscle. Stool softeners: while taking narcotics, take a stool softener (over the counter) twice daily. Incease fluids, fiber. It is very important to keep your bowels regular while taking narcotics. Work: You should plan to be off work for 5-6 days, although this can vary from person to person. Follow up: Please present to Dr. Lawrence Crowell office at your scheduled appointment made prior to surgery. If you do not have an appt, please call the office ASAP to make your first post op visit. We like to see you within one to two days after surgery. Please notify Dr. Jocelin Arzate if:   If your one breast becomes significantly larger than the other  If you develop significant bruising across the chest   If you experience a significant increase in pain in the breast after the pain has improved  If you develop a temperature above 101.5° F  If you develop redness (like a sunburn) around your incisions  If you need help or have any questions feel free to call Dr Jocelin Arzate with your concerns. Dr. Monty Davis is on call 24 hours per day, seven days per week and has an answering service to forward calls. The quality of your cosmetic enhancement may be compromised if you fail to return for any scheduled post-op visits, or follow the pre- and post-operative instructions. Please dont hesitate to report any unusual or concerning changes. Our number is [phone]. DRAIN LOG    Date/Time Right (ml) Left (ml)                                                                          Drain Care Instructions     Your surgery required the placement of drains to remove excess fluids from your wounds. You will notice clear tubes exiting your body connecting to small reservoirs. You will be taught how to care for the drains after surgery. Please empty the drains every twelve hours and record the volumes. To do this, open the small lid on top of bulb and pour the drainage into a small container to measure. It is important that the volume of each drain is recorded separately. Every 24 hours total the individual drain outputs. When finished, squeeze the bulb and hold while replacing the lid. The bulb needs to be collapsed in order to be effective. After the volume becomes low enough the drains will be removed. Please strip the drains every six hours while awake and as needed. Stripping the drain tubes removes clots from the tubes and allows them to drain effectively. Stripping the tubes is very important to proper drain function. To do this, grasp the tubing close to your body with one hand and stabilize it. With the other hand, grasp the tubing below the first hand. Using an alcohol pad or lotion, pinch tubing tightly, sliding fingers down the tubing and away from your body; repeat this 2-3 times. It is okay if the tube becomes flat from the suction.     The drainage will initially be red in color and progressively become thinner in character and change to a pale red and eventually become clear or straw colored. The time required for these changes to occur varies between patients. You may shower 48 hours after surgery. Hold the drains while in the shower and let soap and water run over the incision sites. Pat dry    Breast Massage Following Breast Augmentation   Breast massage will be taught to you TWO weeks  from surgery. No massage is recommended before 2 weeks. The purpose of these exercises is to keep the scar tissue that forms around implants as soft as possible.   By performing these exercises as described, you can help soften the internal scar, minimize the risk of significant capsular contracture and maximize the likelihood of a soft, natural feel and appearance to your breast.

## 2022-12-15 NOTE — PROGRESS NOTES
Handoff report given to [place] RN. Pt stable, pain is tolerable and pt is using PCA. VS with normal limits and pt is stating 97% on Room Air.     Pt transferred to preop Holding #[id number]

## 2022-12-15 NOTE — ANESTHESIA PREPROCEDURE EVALUATION
Relevant Problems   ENDOCRINE   (+) Morbid obesity ([facility] [address]       Anesthetic History     PONV          Review of Systems / Medical History  Patient summary reviewed and pertinent labs reviewed    Pulmonary  Within defined limits                 Neuro/Psych             Comments: Narcolepsy on meds Cardiovascular  Within defined limits                     GI/Hepatic/Renal  Within defined limits              Endo/Other        Obesity     Other Findings            Physical Exam    Airway  Mallampati: II    Neck ROM: normal range of motion   Mouth opening: Normal     Cardiovascular    Rhythm: regular  Rate: normal         Dental    Dentition: Lower dentition intact and Upper dentition intact     Pulmonary  Breath sounds clear to auscultation               Abdominal  GI exam deferred       Other Findings            Anesthetic Plan    ASA: 2  Anesthesia type: general  Propofol infusion        Induction: Intravenous  Anesthetic plan and risks discussed with: Patient

## 2022-12-15 NOTE — ANESTHESIA POSTPROCEDURE EVALUATION
Procedure(s):  FULL ABDOMINOPLASTY, FLANK LIPOSUCTION, BILATERAL BREAST AUGMENTATION WITH FULL MASTOPEXY  ROBOTIC ASSISTED LAP BAND REMOVAL. general    Anesthesia Post Evaluation      Multimodal analgesia: multimodal analgesia not used between 6 hours prior to anesthesia start to PACU discharge  Patient location during evaluation: PACU  Patient participation: complete - patient participated  Level of consciousness: awake  Pain management: adequate  Airway patency: patent  Anesthetic complications: no  Cardiovascular status: acceptable, blood pressure returned to baseline and hemodynamically stable  Respiratory status: acceptable  Hydration status: acceptable  Post anesthesia nausea and vomiting:  controlled  Final Post Anesthesia Temperature Assessment:  Normothermia (36.0-37.5 degrees C)      INITIAL Post-op Vital signs:   Vitals Value Taken Time   /59 12/15/22 1630   Temp 36.4 °C (97.5 °F) 12/15/22 1451   Pulse 80 12/15/22 1653   Resp 17 12/15/22 1653   SpO2 94 % 12/15/22 1653   Vitals shown include unvalidated device data.

## 2022-12-16 VITALS
RESPIRATION RATE: 16 BRPM | SYSTOLIC BLOOD PRESSURE: 113 MMHG | DIASTOLIC BLOOD PRESSURE: 67 MMHG | OXYGEN SATURATION: 97 % | HEART RATE: 86 BPM | TEMPERATURE: 98.1 F | WEIGHT: 206 LBS | BODY MASS INDEX: 34.32 KG/M2 | HEIGHT: 65 IN

## 2022-12-16 PROCEDURE — 74011000250 HC RX REV CODE- 250: Performed by: NURSE PRACTITIONER

## 2022-12-16 PROCEDURE — 74011250637 HC RX REV CODE- 250/637: Performed by: NURSE PRACTITIONER

## 2022-12-16 PROCEDURE — G0378 HOSPITAL OBSERVATION PER HR: HCPCS

## 2022-12-16 PROCEDURE — 74011250636 HC RX REV CODE- 250/636: Performed by: NURSE PRACTITIONER

## 2022-12-16 RX ADMIN — SODIUM CHLORIDE 125 ML/HR: 9 INJECTION, SOLUTION INTRAVENOUS at 00:16

## 2022-12-16 RX ADMIN — ONDANSETRON 4 MG: 2 INJECTION INTRAMUSCULAR; INTRAVENOUS at 05:09

## 2022-12-16 RX ADMIN — ACETAMINOPHEN 650 MG: 325 TABLET ORAL at 00:15

## 2022-12-16 RX ADMIN — ACETAMINOPHEN 650 MG: 325 TABLET ORAL at 08:46

## 2022-12-16 RX ADMIN — CEFAZOLIN 2 G: 1 INJECTION, POWDER, FOR SOLUTION INTRAMUSCULAR; INTRAVENOUS at 05:05

## 2022-12-16 RX ADMIN — OXYCODONE 5 MG: 5 TABLET ORAL at 08:46

## 2022-12-16 RX ADMIN — ENOXAPARIN SODIUM 40 MG: 100 INJECTION SUBCUTANEOUS at 08:46

## 2022-12-16 RX ADMIN — OXYCODONE 5 MG: 5 TABLET ORAL at 05:04

## 2022-12-16 RX ADMIN — DOCUSATE SODIUM 100 MG: 100 CAPSULE ORAL at 08:46

## 2022-12-16 RX ADMIN — SODIUM CHLORIDE, PRESERVATIVE FREE 10 ML: 5 INJECTION INTRAVENOUS at 05:10

## 2022-12-16 NOTE — PROGRESS NOTES
Pt dc home self care with . BL IV's discontinue. Discharge instructions given. Pt stated that she already  prescriptions from the pharmacy prior to the surgery. Pt's  will be taken her home. Pt has a small amount of sanguineus drainage from her MIRLANDE drain. Pt stated that she will empty it at home.

## 2022-12-16 NOTE — PROGRESS NOTES
Pod 1 doing well  Pain controlled  Jps normal  Flaps pink, breasts soft, NAC pink  Umbo dusky    Wounds cdi    Adv diet  Oob  Lovenox  Sang care  Fu Tuesday  Baci to umbo BID

## 2022-12-16 NOTE — PROGRESS NOTES
Bedside shift change report given to [address]  (oncoming nurse) by Arias Duran  (offgoing nurse). Report included the following information SBAR, Kardex, MAR, Recent Results, and Med Rec Status.

## 2022-12-16 NOTE — PROGRESS NOTES
TRANSFER - IN REPORT:    Verbal report received from Ashley Hickey (name) on Baltazar Tomlin  being received from OR (unit) for routine post - op      Report consisted of patients Situation, Background, Assessment and   Recommendations(SBAR). Information from the following report(s) SBAR, Kardex, ED Summary, Accordion, and Med Rec Status was reviewed with the receiving nurse. Opportunity for questions and clarification was provided. Assessment completed upon patients arrival to unit and care assumed.

## 2022-12-16 NOTE — ROUTINE PROCESS
TRANSFER - OUT REPORT:    Verbal report given to Vanesa Llanos RN(name) on Wilbert Howell  being transferred to 5th floor(unit) for routine post - op       Report consisted of patients Situation, Background, Assessment and   Recommendations(SBAR). Information from the following report(s) SBAR, OR Summary, Intake/Output, and MAR was reviewed with the receiving nurse. Lines:   Peripheral IV 12/15/22 Anterior;Right Forearm (Active)   Site Assessment Clean, dry, & intact 12/15/22 1445   Phlebitis Assessment 0 12/15/22 1445   Infiltration Assessment 1 12/15/22 1445   Dressing Status Clean, dry, & intact 12/15/22 1445   Dressing Type Transparent;Tape 12/15/22 1445   Hub Color/Line Status Pink; Infusing 12/15/22 1445   Alcohol Cap Used Yes 12/15/22 0723       Peripheral IV 12/15/22 Left Hand (Active)   Site Assessment Clean, dry, & intact 12/15/22 1445   Phlebitis Assessment 0 12/15/22 1445   Infiltration Assessment 0 12/15/22 1445   Dressing Status Clean, dry, & intact 12/15/22 1445   Dressing Type Transparent;Tape 12/15/22 1445   Hub Color/Line Status Green;Capped 12/15/22 1445        Opportunity for questions and clarification was provided.       Patient transported with:   Registered Nurse

## 2022-12-17 NOTE — OP NOTES
[facility]  OPERATIVE REPORT    Name:  Dakota Singleton  MR#:  [id number]  :  1990  ACCOUNT #:  [de-identified]  DATE OF SERVICE:  12/15/2022    PREOPERATIVE DIAGNOSIS:  Cosmetic, dysphagia. POSTOPERATIVE DIAGNOSIS: Cosmetic, dysphagia. PROCEDURE PERFORMED:  Robotic-assisted Lap-Band removal.    SURGEON:  Grover Stoner MD    ASSISTANT:  Won Miguel    ANESTHESIA:  General.    COMPLICATIONS:  None. SPECIMENS REMOVED:  Lap-Band tubing in four pieces sent for gross analysis. IMPLANTS:      ESTIMATED BLOOD LOSS:  Minimal.    INDICATIONS:  The patient is a 65-year-old female who has had a longstanding Lap-Band. The patient reports ongoing dysphagia. She requested removal.    PROCEDURE IN DETAIL:  Consent was obtained. She was taken to the operating room and placed in supine position. SCDs were turned on and working. Jones catheter was placed. After successful induction of general endotracheal anesthesia, the abdomen was prepped and draped in the usual sterile fashion. Time-out was performed per protocol. Dr. Bob Fleming his portion of the procedure and mobilized skin and subcutaneous flaps to expose upper abdomen rectus sheath. The abdomen was entered under direct camera vision with an 8-mm tissue dissection trocar. Ports were placed in the following fashion:    1. A left upper abdomen paramedian 8-mm port. 2.  A right upper abdomen paramedian 8-mm port. 3.  A right subcostal abdomen 8-mm port. The robot was brought in and docked. The band tubing was cut for control. Adhesions were then cut away from the left lobe of the liver to visualize the hiatus. The band buckle was undone. Shoe shine technique was performed, but the band was still specifically stuck by the encased adhesions around the band. The thickened band adhesion was cut allowing the band to sweep freely from around the top of the stomach.   The band was cut in four pieces and retrieved and passed off for gross analysis. There were no bleeding or other problems. The insufflation was ended terminally. Dr. Idalia Guerra then proceeded with his portion of the case. In my portion of the case, blood loss was less than 1 mL.       Sheron Lundy MD      [id number]  D:  12/16/2022 21:42  T:  12/17/2022 16:21  JOB #:  [id number]

## 2022-12-19 NOTE — PROGRESS NOTES
Reason for Admission:  Cosmetic                    RUR Score: N/A- OBS                     Plan for utilizing home health:  Not appropriate at this time      PCP: First and Last name:  Bird Luevano MD   Name of Practice:    Are you a current patient: Yes/No: Yes   Approximate date of last visit: Last year    Can you participate in a virtual visit with your PCP: Yes                    Current Advanced Directive/Advance Care Plan: Prior- no AMD on file. Pt is not interested in arranging any at this time- understands Faith. Healthcare Decision Maker: Updated to reflect information obtained. Transition of Care Plan:                     Pt is a 34year old,  female, admitted with elective cosmetic surgery. Pt was AOX4 when speaking with CM, confirming address, emergency contact and PCP. Pt lives with her  and is independent with ADLs. Pt has no DME and drives at baseline. Pt has not had HH or been to any SNF/IPR in the past.     Pt confirmed insurance coverage- states no problems affording or accessing medications. Pt states her  will drive her home at time of discharge and as needed. Care Management Interventions  PCP Verified by CM:  Yes  Mode of Transport at Discharge: Self  Transition of Care Consult (CM Consult): Discharge Planning  MyChart Signup: No  Discharge Durable Medical Equipment: No  Health Maintenance Reviewed: Yes  Physical Therapy Consult: No  Occupational Therapy Consult: No  Speech Therapy Consult: No  Support Systems: Spouse/Significant Other, Child(ania), Other Family Member(s)  Confirm Follow Up Transport: Family  The Patient and/or Patient Representative was Provided with a Choice of Provider and Agrees with the Discharge Plan?: Yes  Freedom of Choice List was Provided with Basic Dialogue that Supports the Patient's Individualized Plan of Care/Goals, Treatment Preferences and Shares the Quality Data Associated with the Providers?: Yes  Discharge Location  Patient Expects to be Discharged to[de-identified] Home with family assistance     AMAURY Singer, [id number]

## 2022-12-20 NOTE — OP NOTES
[facility] 78  OPERATIVE REPORT    Name:  Charity Bolton  MR#:  [id number]  :  1990  ACCOUNT #:  [de-identified]  DATE OF SERVICE:  12/15/2022    PREOPERATIVE DIAGNOSES:  1. Dermatolipodystrophy, abdominal wall and flanks. 2.  Rectus diastasis. 3.  Ptosis of the breast.    POSTOPERATIVE DIAGNOSES:  1. Dermatolipodystrophy, abdominal wall and flanks. 2.  Rectus diastasis. 3.  Ptosis of the breast.    PROCEDURES PERFORMED:  1. Tumescent liposuction bilateral flanks. 2.  Breast augmentation with full mastopexy. 3.  Full abdominoplasty. 4.  Robotic-assisted removal of laparoscopic gastric bands. SURGEON:  Jorge Vargas MD    ASSISTANT:  Liyah Lopez NP    ANESTHESIA:  General endotracheal.    COMPLICATIONS:  None. SPECIMENS REMOVED:  1. Total aspirate liposuction 1400 mL. 2.  Total excised tissue, abdominal wall 1840 g. 3.  Total excised tissue, right breast 50 g.  4.  Total excised tissue, left breast 50 g. IMPLANTS:  See note. ESTIMATED BLOOD LOSS:  200 mL. DRAINS:  19-German Julio x2. COUNTS:  Correct x2. DISPOSITION:  Stable to PACU.    BRIEF HISTORY:  The patient is a 50-year-old female presenting for cosmetic enhancement of the breast and abdominal wall and flanks. She has undergone previous gastric banding. She will also have her gastric bands removed with robotic assistance by Dr. Lewanda Hodgkin, general surgeon. This will be dictated under a separate note. The overall procedure, incisional approach, expected outcomes and recovery were discussed.   The risks, benefits and alternatives to the procedure including but not limited to bleeding, infection, damage to surrounding tissue structures, the need for revisional surgery, seroma, hematoma, capsule contracture, implant rupture, implant deflation, the need for future implant, maintenance and surveillance MRIs, partial or total loss of sensation to the nipple, partial or total loss of the nipple, skin flap necrosis, fat necrosis, abdominal wall flap necrosis, umbilical necrosis, damage to intra-abdominal tissues, PE, DVT, fat embolism and contour deformities were discussed. She understood these risks and agreed to proceed. PROCEDURE NOTE:  Preoperative markings were made with the patient in the standing position. Informed consent was obtained. The patient was taken to the operating room, placed supine on the operating table. Sequential compression boots were placed. General endotracheal anesthesia was obtained. A lower body Zaida Hugger and Jones catheter were placed. The chest and abdominal wall were prepped circumferentially in the usual sterile fashion. Two lower abdominal wall liposuction port sites were made sharply within the abdominoplasty resection specimen. The bilateral flanks were then infused with 1300 mL of standard tumescent solution consisting of 1 liter of warm lactated Ringer's mixed with an ampule of epinephrine and 10 mL of 2% lidocaine plain using a Lamis infiltrating cannula. Tumescent liposuction was then performed with a 4.0-mm blunt Mercedes tip flared cannula in the deep and intermediate planes followed by 3.0-mm blunt Mercedes tip flared cannula in the superficial planes. Cross tunneling was performed at all times and the adequacy of the resection was confirmed with a deep palpation and pinch test.  A total of 700 mL was removed from each flank. Attention was then turned to the bilateral breasts. Bilateral circumareolar incisions were designed with the nipple on moderate stretch using a 38-mm cookie cutter. Bilateral inframammary incisions were designed 5 cm in length. The right incision was made sharply. Dissection carried down through the subcutaneous tissue and Genoveva's fascia to the level of the anterior chest wall fascia. The inferior medial border of the pectoralis major muscle was then seen along the anterior surface of the rib.   The pectoralis major muscle was then incised along its inferior border and the subpectoral space was then entered bluntly and dissected superiorly and laterally. Using a lighted retractor, the subpectoral space was explored. Hemostasis was secured. The inferior lateral border of the muscle was released from the chest wall. The pocket was then irrigated with 500 mL of half-strength Betadine solution, 2 liters of triple antibiotic solution and 10 mL of 0.5% Marcaine with epinephrine. Gloves were changed. A Sientra smooth round moderate plus profile silicone implant 516 mL, serial number [id number] was presoaked in triple antibiotic solution. The implant was then placed in the right breast pocket via a Lofton funnel using the no-touch technique. Orientation of the implant was confirmed. The wound was tailor-tacked closed with surgical staples and the exact same procedure was repeated on the left side with implant serial number [id number]. The patient was then placed in the reclining position. The full mastopexy was then designed. The vertical lift component was designed raising the nipple approximately 4 cm bilaterally. Measurements were confirmed with sternal notch to nipple and midline to nipple distances. The patient was then placed supine. The circumareolar incisions and vertical lift patterns were then incised and the intervening skin de-epithelialized. A lower pole central wedge excision was then performed down to the level of Genoveva's fascia. The pillars were then transposed together and sutured in place with interrupted 3-0 Monocryl suture. The nipple was then transposed through the new aperture and inset with interrupted deep dermal 3-0 Monocryl. The vertical incision was closed with interrupted deep dermal 3-0 Monocryl. The patient was then placed again in the reclining position. The lower pole wedge resection was then performed setting the new nipple to inframammary fold distance at 8.5 cm.   The patient was then placed supine. The horizontal and inframammary incisions were then made. Skin and subcutaneous tissue and parenchyma were resected down to the level of Genoveva fascia. The implant tunnel was then closed with running 2-0 PDS suture. The wounds were then washed with saline solution. Hemostasis was secured. The superior fasciocutaneous breast flap was transposed inferiorly to its anatomic position and tailor-tacked closed with surgical staples and definitively closed with interrupted and running deep dermal and subcutaneous 2-0 Monocryl and running subcuticular 3-0 Monocryl. The remainder of the breast was closed with running subcuticular 3-0 Monocryl. Symmetry and volume were satisfactory and the nipples and skin flaps were perfusing well at this point in the operation. Attention was then turned to the lower abdominal wall. The lower transverse abdominal incision was then made sharply. Dissection carried down through the subcutaneous tissue and Genoveva fascia to the level of the anterior abdominal wall fascia. The superiorly-based fasciocutaneous abdominal wall flap was raised to the level of the umbilicus. Large perforating vessels were ligated with 2-0 Vicryl stick ties and electrocautery. The umbilicus was then incised circumferentially and dissected along its stalk directly down to the anterior abdominal wall fascia. The flap was then split sharply along the midline to the level of the umbilicus to aid in exposure. The flap was then further raised centrally along the anterior rectus sheath to the level of the xiphoid process centrally and laterally to the subcostal margins. Hemostasis was secured throughout. Moist laparotomy pads were then placed on the flap and along the abdominal wall fascia. The robotic-assisted lap band removal was then completed at this point in the case by Dr. Paige Faye and will be dictated under a separate note.     At the conclusion of his procedure, the operating field was then inspected. Hemostasis was again secured, 4 cm of diastasis was seen along the midline at the level of the umbilicus. Plication was indicated. A fusiform plication pattern was then performed from the xiphoid to the umbilicus and from the pubis to the umbilicus. This was then constructed with a running imbricating double layered 0 nylon suture. Contour improved and appeared satisfactory. The entire wound bed was then irrigated with 2 liters of triple antibiotic solution. Hemostasis was secured. Exparel 20 mL was expanded to 100 mL and injected into the anterior abdominal wall fascia and along the incision lines. Two #19-Maldivian Julio drains were brought out through lateral stab incision and secured to the skin with 3-0 nylon and placed within the wound bed. The patient was then placed in the beach-chair position. The superior fasciocutaneous abdominal wall flap was transposed inferiorly to its new anatomic position. The upper resection lines were then marked and confirmed to close without undue tension. The right and left skin and subcutaneous tissue wedges were then incised and removed with the plasma blade. The right side weighed 840 g. The left side weighed 1000 g. Hemostasis was secured. The flap was then secured to the anterior abdominal wall fascia with interrupted 0 Vicryl progressive tension sutures. The wound was then closed with interrupted 0 Monocryl suture on Genoveva's fascia, running deep dermal 2-0 Monocryl and running subcuticular 3-0 Monocryl. The umbilicus was then brought out through a new aperture along the midline at its previous anatomic position. A 2 cm oval-shaped incision was then incised and core of skin and subcutaneous tissue was then removed. The umbilicus was then transposed to this new opening in proper orientation without tension and inset with interrupted deep dermal and subcuticular 3-0 Monocryl.   The skin flap, umbilicus and breast flaps and nipples were perfusing well to light touch and pinprick at the end of the case. The wounds were then dressed with Steri-Strips, 4x4s, Medipore tape. A surgical bra was placed. An abdominal binder was placed. Anesthesia was then discontinued. The patient extubated in the operating room having tolerated the procedure well. The needle, instrument and laparotomy pad counts at the end of the case were correct.       Iván Molina MD      [id number]  D:  12/20/2022 13:59  T:  12/20/2022 15:30  JOB #:  [id number]

## (undated) DEVICE — GLOVE ORTHO 8   MSG9480

## (undated) DEVICE — CANISTER, RIGID, 3000CC: Brand: MEDLINE INDUSTRIES, INC.

## (undated) DEVICE — SUTURE PDS II SZ 2-0 L27IN ABSRB VLT SH L26MM 1/2 CIR Z317H

## (undated) DEVICE — LAPAROTOMY-SFMC: Brand: MEDLINE INDUSTRIES, INC.

## (undated) DEVICE — PACK,UNIVERSAL,NO GOWNS: Brand: MEDLINE

## (undated) DEVICE — MICRODISSECTION NEEDLE STRAIGHT SLEEVE: Brand: COLORADO

## (undated) DEVICE — PLASMABLADE PS210-030S 3.0S LOCK: Brand: PLASMABLADE™

## (undated) DEVICE — SPONGE STIK 8IN STER PRE-SAT --

## (undated) DEVICE — STRIP,CLOSURE,WOUND,MEDI-STRIP,1/2X4: Brand: MEDLINE

## (undated) DEVICE — SUT SLK 3-0 30IN TIE MP BLK --

## (undated) DEVICE — 3M™ MEDIPORE™ H SOFT CLOTH TAPE SHORT ROLL TAPE 6INCHES X 2 YARDS 16 ROLLS/CASE 2866S: Brand: 3M™ MEDIPORE™

## (undated) DEVICE — SUTURE PERMAHAND SZ 3-0 L18IN NONABSORBABLE BLK L26MM SH C013D

## (undated) DEVICE — SUTURE MCRYL SZ 3-0 L27IN ABSRB UD L19MM PS-2 3/8 CIR PRIM Y427H

## (undated) DEVICE — GOWN,SIRUS,NONRNF,SETINSLV,2XL,18/CS: Brand: MEDLINE

## (undated) DEVICE — NEEDLE,21GX1",REG,BEVEL: Brand: MEDLINE

## (undated) DEVICE — GLOVE SURG SZ 7 L12IN FNGR THK79MIL GRN LTX FREE

## (undated) DEVICE — DRESSING FOAM DISK DIA1IN H 7MM HYDRPHLC CHG IMPREG IN SL

## (undated) DEVICE — SOLUTION IV SODIUM CHL 0.9% 100 ML INJ FLX CONTAINER

## (undated) DEVICE — BLADE ELECTRODE: Brand: EDGE

## (undated) DEVICE — SPONGE GZ W4XL4IN COT 12 PLY TYP VII WVN C FLD DSGN

## (undated) DEVICE — PLASTICS -SFMC: Brand: MEDLINE INDUSTRIES, INC.

## (undated) DEVICE — BLANKET WRM W35.4XL86.6IN FULL UNDERBODY + FORC AIR

## (undated) DEVICE — TOTAL TRAY, 16FR 10ML SIL FOLEY, URN: Brand: MEDLINE

## (undated) DEVICE — SOLUTION IRRIG 1000ML 0.9% SOD CHL USP POUR PLAS BTL

## (undated) DEVICE — STRAP,POSITIONING,KNEE/BODY,FOAM,4X60": Brand: MEDLINE

## (undated) DEVICE — SUTURE MCRYL SZ 0 L36IN ABSRB UD L36MM CT-1 1/2 CIR Y946H

## (undated) DEVICE — PAD,ABDOMINAL,5"X9",ST,LF,25/BX: Brand: MEDLINE INDUSTRIES, INC.

## (undated) DEVICE — SUTURE MCRYL SZ 3-0 L27IN ABSRB UD L26MM SH 1/2 CIR Y416H

## (undated) DEVICE — GLOVE SURG SZ 65 CRM LTX FREE POLYISOPRENE POLYMER BEAD ANTI

## (undated) DEVICE — GLOVE ORANGE PI 7 1/2   MSG9075

## (undated) DEVICE — 3M™ TEGADERM™ TRANSPARENT FILM DRESSING FRAME STYLE, 1626W, 4 IN X 4-3/4 IN (10 CM X 12 CM), 50/CT 4CT/CASE: Brand: 3M™ TEGADERM™

## (undated) DEVICE — SUTURE MCRYL SZ 2-0 L27IN ABSRB UD SH L26MM TAPERPOINT NDL Y417H

## (undated) DEVICE — SUTURE VCRL SZ 0 L27IN ABSRB UD SH L26MM 1/2 CIR TAPERPOINT J418H

## (undated) DEVICE — SYR 10ML LUER LOK 1/5ML GRAD --

## (undated) DEVICE — TUBING, SUCTION, 1/4" X 10', STRAIGHT: Brand: MEDLINE

## (undated) DEVICE — INTENDED FOR TISSUE SEPARATION, AND OTHER PROCEDURES THAT REQUIRE A SHARP SURGICAL BLADE TO PUNCTURE OR CUT.: Brand: BARD-PARKER ® CARBON RIB-BACK BLADES

## (undated) DEVICE — REM POLYHESIVE ADULT PATIENT RETURN ELECTRODE: Brand: VALLEYLAB

## (undated) DEVICE — COVER LT HNDL PLAS RIG 1 PER PK

## (undated) DEVICE — RESERVOIR,SUCTION,100CC,SILICONE: Brand: MEDLINE

## (undated) DEVICE — GLOVE SURG SZ 65 THK91MIL LTX FREE SYN POLYISOPRENE

## (undated) DEVICE — DRAPE FLD WRM W44XL66IN C6L FOR INTRATEMP SYS THERMABASIN

## (undated) DEVICE — DRAPE,CHEST,FENES,15X10,STERIL: Brand: MEDLINE

## (undated) DEVICE — SUTURE PROL SZ 0 L30IN NONABSORBABLE BLU L36MM CT-1 1/2 CIR 8424H

## (undated) DEVICE — SYR 50ML LR LCK 1ML GRAD NSAF --

## (undated) DEVICE — SOL INJ SOD CL0.9% 10ML PREFIL -- SUB B-D306546Z 30/BX $6.00

## (undated) DEVICE — PREMIUM WET SKIN PREP TRAY: Brand: MEDLINE INDUSTRIES, INC.

## (undated) DEVICE — ROCKER SWITCH PENCIL BLADE ELECTRODE, HOLSTER: Brand: EDGE

## (undated) DEVICE — BLADE ASSEMB CLP HAIR FINE --

## (undated) DEVICE — BANDAGE COBAN 4 IN COMPR W4INXL5YD FOAM COHESIVE QUIK STK SELF ADH SFT

## (undated) DEVICE — DRESSING,GAUZE,XEROFORM,CURAD,1"X8",ST: Brand: CURAD

## (undated) DEVICE — STAPLER SKIN H3.9MM WIRE DIA0.58MM CRWN 6.9MM 35 STPL ROT

## (undated) DEVICE — SUT ETHLN 3-0 18IN PS2 BLK --

## (undated) DEVICE — HYPODERMIC SAFETY NEEDLE: Brand: MAGELLAN

## (undated) DEVICE — BINDER ABD S/M 12X30-45IN --

## (undated) DEVICE — TRANSFER SET 3": Brand: MEDLINE INDUSTRIES, INC.

## (undated) DEVICE — STRIP SKIN CLSR W0.25XL4IN WHT SPUNBOUND FBR NYL HI ADH